# Patient Record
Sex: FEMALE
[De-identification: names, ages, dates, MRNs, and addresses within clinical notes are randomized per-mention and may not be internally consistent; named-entity substitution may affect disease eponyms.]

---

## 2017-07-12 PROBLEM — Z00.00 ENCOUNTER FOR PREVENTIVE HEALTH EXAMINATION: Status: ACTIVE | Noted: 2017-07-12

## 2017-10-19 ENCOUNTER — APPOINTMENT (OUTPATIENT)
Dept: HEART AND VASCULAR | Facility: CLINIC | Age: 82
End: 2017-10-19
Payer: MEDICARE

## 2017-10-19 ENCOUNTER — MED ADMIN CHARGE (OUTPATIENT)
Age: 82
End: 2017-10-19

## 2017-10-19 VITALS
OXYGEN SATURATION: 98 % | DIASTOLIC BLOOD PRESSURE: 90 MMHG | BODY MASS INDEX: 18.8 KG/M2 | HEART RATE: 109 BPM | WEIGHT: 97 LBS | HEIGHT: 60.2 IN | TEMPERATURE: 98.1 F | SYSTOLIC BLOOD PRESSURE: 150 MMHG

## 2017-10-19 DIAGNOSIS — L30.9 DERMATITIS, UNSPECIFIED: ICD-10-CM

## 2017-10-19 DIAGNOSIS — I49.9 CARDIAC ARRHYTHMIA, UNSPECIFIED: ICD-10-CM

## 2017-10-19 PROCEDURE — 93000 ELECTROCARDIOGRAM COMPLETE: CPT

## 2017-10-19 PROCEDURE — G0008: CPT

## 2017-10-19 PROCEDURE — 99214 OFFICE O/P EST MOD 30 MIN: CPT | Mod: 25

## 2017-10-19 PROCEDURE — 90662 IIV NO PRSV INCREASED AG IM: CPT

## 2017-10-19 RX ORDER — LATANOPROST 50 UG/ML
0.01 SOLUTION OPHTHALMIC DAILY
Qty: 2 | Refills: 0 | Status: ACTIVE | COMMUNITY

## 2017-10-19 RX ORDER — BRINZOLAMIDE/BRIMONIDINE TARTRATE 10; 2 MG/ML; MG/ML
1-0.2 SUSPENSION/ DROPS OPHTHALMIC TWICE DAILY
Refills: 0 | Status: ACTIVE | COMMUNITY

## 2018-02-13 ENCOUNTER — APPOINTMENT (OUTPATIENT)
Dept: HEART AND VASCULAR | Facility: CLINIC | Age: 83
End: 2018-02-13
Payer: MEDICARE

## 2018-02-13 VITALS
DIASTOLIC BLOOD PRESSURE: 90 MMHG | BODY MASS INDEX: 18.41 KG/M2 | HEIGHT: 60.2 IN | TEMPERATURE: 97.4 F | WEIGHT: 95 LBS | HEART RATE: 92 BPM | OXYGEN SATURATION: 99 % | SYSTOLIC BLOOD PRESSURE: 160 MMHG

## 2018-02-13 DIAGNOSIS — I10 ESSENTIAL (PRIMARY) HYPERTENSION: ICD-10-CM

## 2018-02-13 PROCEDURE — 99213 OFFICE O/P EST LOW 20 MIN: CPT

## 2018-02-13 RX ORDER — PILOCARPINE HYDROCHLORIDE OPHTHALMIC SOLUTION 40 MG/ML
4 SOLUTION/ DROPS OPHTHALMIC
Refills: 0 | Status: ACTIVE | COMMUNITY

## 2018-06-12 ENCOUNTER — APPOINTMENT (OUTPATIENT)
Dept: HEART AND VASCULAR | Facility: CLINIC | Age: 83
End: 2018-06-12
Payer: MEDICARE

## 2018-06-12 VITALS
DIASTOLIC BLOOD PRESSURE: 98 MMHG | SYSTOLIC BLOOD PRESSURE: 160 MMHG | WEIGHT: 98 LBS | TEMPERATURE: 97.5 F | HEART RATE: 113 BPM | BODY MASS INDEX: 18.99 KG/M2 | HEIGHT: 60.2 IN | OXYGEN SATURATION: 97 %

## 2018-06-12 PROCEDURE — 99214 OFFICE O/P EST MOD 30 MIN: CPT

## 2018-06-12 RX ORDER — FLUOCINONIDE 0.5 MG/G
0.05 CREAM TOPICAL 3 TIMES DAILY
Qty: 90 | Refills: 2 | Status: ACTIVE | COMMUNITY
Start: 2017-10-19 | End: 1900-01-01

## 2018-08-10 ENCOUNTER — RX RENEWAL (OUTPATIENT)
Age: 83
End: 2018-08-10

## 2018-08-13 ENCOUNTER — RX RENEWAL (OUTPATIENT)
Age: 83
End: 2018-08-13

## 2018-09-25 ENCOUNTER — APPOINTMENT (OUTPATIENT)
Dept: HEART AND VASCULAR | Facility: CLINIC | Age: 83
End: 2018-09-25

## 2018-11-13 ENCOUNTER — APPOINTMENT (OUTPATIENT)
Dept: HEART AND VASCULAR | Facility: CLINIC | Age: 83
End: 2018-11-13
Payer: MEDICARE

## 2018-11-13 VITALS
HEART RATE: 78 BPM | WEIGHT: 93.5 LBS | DIASTOLIC BLOOD PRESSURE: 80 MMHG | BODY MASS INDEX: 18.36 KG/M2 | SYSTOLIC BLOOD PRESSURE: 160 MMHG | OXYGEN SATURATION: 98 % | TEMPERATURE: 97.7 F | HEIGHT: 60 IN

## 2018-11-13 PROCEDURE — 90662 IIV NO PRSV INCREASED AG IM: CPT

## 2018-11-13 PROCEDURE — G0008: CPT

## 2018-11-13 PROCEDURE — 99214 OFFICE O/P EST MOD 30 MIN: CPT

## 2018-11-13 NOTE — PHYSICAL EXAM
[General Appearance - Well Developed] : well developed [Normal Appearance] : normal appearance [Well Groomed] : well groomed [General Appearance - Well Nourished] : well nourished [No Deformities] : no deformities [General Appearance - In No Acute Distress] : no acute distress [Normal Conjunctiva] : the conjunctiva exhibited no abnormalities [Eyelids - No Xanthelasma] : the eyelids demonstrated no xanthelasmas [Normal Oral Mucosa] : normal oral mucosa [No Oral Pallor] : no oral pallor [No Oral Cyanosis] : no oral cyanosis [Normal Jugular Venous A Waves Present] : normal jugular venous A waves present [Normal Jugular Venous V Waves Present] : normal jugular venous V waves present [No Jugular Venous Csear A Waves] : no jugular venous cesar A waves [Respiration, Rhythm And Depth] : normal respiratory rhythm and effort [Exaggerated Use Of Accessory Muscles For Inspiration] : no accessory muscle use [Auscultation Breath Sounds / Voice Sounds] : lungs were clear to auscultation bilaterally [Heart Rate And Rhythm] : heart rate and rhythm were normal [Heart Sounds] : normal S1 and S2 [Murmurs] : no murmurs present [Abdomen Soft] : soft [Abdomen Tenderness] : non-tender [Abdomen Mass (___ Cm)] : no abdominal mass palpated [Abnormal Walk] : normal gait [Gait - Sufficient For Exercise Testing] : the gait was sufficient for exercise testing [Nail Clubbing] : no clubbing of the fingernails [Cyanosis, Localized] : no localized cyanosis [Petechial Hemorrhages (___cm)] : no petechial hemorrhages [Skin Color & Pigmentation] : normal skin color and pigmentation [] : no rash [No Venous Stasis] : no venous stasis [Skin Lesions] : no skin lesions [No Skin Ulcers] : no skin ulcer [No Xanthoma] : no  xanthoma was observed [Oriented To Time, Place, And Person] : oriented to person, place, and time [Affect] : the affect was normal [Mood] : the mood was normal [No Anxiety] : not feeling anxious

## 2019-02-19 ENCOUNTER — APPOINTMENT (OUTPATIENT)
Dept: HEART AND VASCULAR | Facility: CLINIC | Age: 84
End: 2019-02-19
Payer: MEDICARE

## 2019-02-19 ENCOUNTER — MED ADMIN CHARGE (OUTPATIENT)
Age: 84
End: 2019-02-19

## 2019-02-19 VITALS
SYSTOLIC BLOOD PRESSURE: 162 MMHG | HEIGHT: 60.63 IN | TEMPERATURE: 97.9 F | BODY MASS INDEX: 17.6 KG/M2 | DIASTOLIC BLOOD PRESSURE: 90 MMHG | OXYGEN SATURATION: 98 % | WEIGHT: 92 LBS | HEART RATE: 94 BPM

## 2019-02-19 VITALS — RESPIRATION RATE: 14 BRPM | DIASTOLIC BLOOD PRESSURE: 80 MMHG | SYSTOLIC BLOOD PRESSURE: 130 MMHG | HEART RATE: 70 BPM

## 2019-02-19 PROCEDURE — 90732 PPSV23 VACC 2 YRS+ SUBQ/IM: CPT

## 2019-02-19 PROCEDURE — G0009: CPT

## 2019-02-19 PROCEDURE — 99214 OFFICE O/P EST MOD 30 MIN: CPT

## 2019-02-19 NOTE — PHYSICAL EXAM
[General Appearance - Well Developed] : well developed [Normal Appearance] : normal appearance [Well Groomed] : well groomed [General Appearance - Well Nourished] : well nourished [No Deformities] : no deformities [General Appearance - In No Acute Distress] : no acute distress [Normal Conjunctiva] : the conjunctiva exhibited no abnormalities [Eyelids - No Xanthelasma] : the eyelids demonstrated no xanthelasmas [Normal Oral Mucosa] : normal oral mucosa [No Oral Pallor] : no oral pallor [No Oral Cyanosis] : no oral cyanosis [Normal Jugular Venous A Waves Present] : normal jugular venous A waves present [Normal Jugular Venous V Waves Present] : normal jugular venous V waves present [No Jugular Venous Cesar A Waves] : no jugular venous cesar A waves [Respiration, Rhythm And Depth] : normal respiratory rhythm and effort [Exaggerated Use Of Accessory Muscles For Inspiration] : no accessory muscle use [Auscultation Breath Sounds / Voice Sounds] : lungs were clear to auscultation bilaterally [Heart Rate And Rhythm] : heart rate and rhythm were normal [Heart Sounds] : normal S1 and S2 [Murmurs] : no murmurs present [Abdomen Soft] : soft [Abdomen Tenderness] : non-tender [Abdomen Mass (___ Cm)] : no abdominal mass palpated [Abnormal Walk] : normal gait [Gait - Sufficient For Exercise Testing] : the gait was sufficient for exercise testing [Nail Clubbing] : no clubbing of the fingernails [Cyanosis, Localized] : no localized cyanosis [Petechial Hemorrhages (___cm)] : no petechial hemorrhages [Skin Color & Pigmentation] : normal skin color and pigmentation [] : no rash [No Venous Stasis] : no venous stasis [Skin Lesions] : no skin lesions [No Skin Ulcers] : no skin ulcer [No Xanthoma] : no  xanthoma was observed [Oriented To Time, Place, And Person] : oriented to person, place, and time [Affect] : the affect was normal [Mood] : the mood was normal [No Anxiety] : not feeling anxious

## 2019-02-19 NOTE — REASON FOR VISIT
[FreeTextEntry1] : She has no complaints at all other than frequent urination due to chronic uterine prolapse.

## 2019-02-20 ENCOUNTER — MED ADMIN CHARGE (OUTPATIENT)
Age: 84
End: 2019-02-20

## 2019-05-07 ENCOUNTER — RX RENEWAL (OUTPATIENT)
Age: 84
End: 2019-05-07

## 2019-05-14 ENCOUNTER — APPOINTMENT (OUTPATIENT)
Dept: HEART AND VASCULAR | Facility: CLINIC | Age: 84
End: 2019-05-14

## 2019-10-28 ENCOUNTER — RX RENEWAL (OUTPATIENT)
Age: 84
End: 2019-10-28

## 2020-01-28 ENCOUNTER — RX RENEWAL (OUTPATIENT)
Age: 85
End: 2020-01-28

## 2020-04-14 ENCOUNTER — RX RENEWAL (OUTPATIENT)
Age: 85
End: 2020-04-14

## 2020-04-15 ENCOUNTER — RX RENEWAL (OUTPATIENT)
Age: 85
End: 2020-04-15

## 2020-04-16 ENCOUNTER — RX RENEWAL (OUTPATIENT)
Age: 85
End: 2020-04-16

## 2020-04-19 ENCOUNTER — RX RENEWAL (OUTPATIENT)
Age: 85
End: 2020-04-19

## 2020-04-19 RX ORDER — DILTIAZEM HYDROCHLORIDE 180 MG/1
180 CAPSULE, EXTENDED RELEASE ORAL
Qty: 90 | Refills: 2 | Status: ACTIVE | COMMUNITY
Start: 2018-08-13 | End: 1900-01-01

## 2021-04-13 ENCOUNTER — RX RENEWAL (OUTPATIENT)
Age: 86
End: 2021-04-13

## 2021-07-16 ENCOUNTER — RX RENEWAL (OUTPATIENT)
Age: 86
End: 2021-07-16

## 2021-07-23 ENCOUNTER — RX RENEWAL (OUTPATIENT)
Age: 86
End: 2021-07-23

## 2021-07-26 ENCOUNTER — RX RENEWAL (OUTPATIENT)
Age: 86
End: 2021-07-26

## 2021-07-26 RX ORDER — DILTIAZEM HYDROCHLORIDE 180 MG/1
180 CAPSULE, EXTENDED RELEASE ORAL
Qty: 90 | Refills: 0 | Status: ACTIVE | COMMUNITY
Start: 2019-10-28 | End: 1900-01-01